# Patient Record
Sex: FEMALE | Race: WHITE | NOT HISPANIC OR LATINO | URBAN - METROPOLITAN AREA
[De-identification: names, ages, dates, MRNs, and addresses within clinical notes are randomized per-mention and may not be internally consistent; named-entity substitution may affect disease eponyms.]

---

## 2023-06-27 ENCOUNTER — OFFICE VISIT (OUTPATIENT)
Dept: URGENT CARE | Facility: CLINIC | Age: 43
End: 2023-06-27
Payer: COMMERCIAL

## 2023-06-27 VITALS
OXYGEN SATURATION: 96 % | HEART RATE: 112 BPM | BODY MASS INDEX: 25.39 KG/M2 | RESPIRATION RATE: 18 BRPM | TEMPERATURE: 99 F | DIASTOLIC BLOOD PRESSURE: 74 MMHG | WEIGHT: 158 LBS | SYSTOLIC BLOOD PRESSURE: 124 MMHG | HEIGHT: 66 IN

## 2023-06-27 DIAGNOSIS — U07.1 COVID: Primary | ICD-10-CM

## 2023-06-27 DIAGNOSIS — R05.9 COUGH, UNSPECIFIED TYPE: ICD-10-CM

## 2023-06-27 PROCEDURE — 99213 PR OFFICE/OUTPT VISIT, EST, LEVL III, 20-29 MIN: ICD-10-PCS | Mod: S$GLB,,, | Performed by: PHYSICIAN ASSISTANT

## 2023-06-27 PROCEDURE — 99213 OFFICE O/P EST LOW 20 MIN: CPT | Mod: S$GLB,,, | Performed by: PHYSICIAN ASSISTANT

## 2023-06-27 RX ORDER — FLUTICASONE PROPIONATE 50 MCG
SPRAY, SUSPENSION (ML) NASAL
COMMUNITY

## 2023-06-27 RX ORDER — PROMETHAZINE HYDROCHLORIDE AND DEXTROMETHORPHAN HYDROBROMIDE 6.25; 15 MG/5ML; MG/5ML
5 SYRUP ORAL EVERY 4 HOURS PRN
Qty: 240 ML | Refills: 0 | Status: SHIPPED | OUTPATIENT
Start: 2023-06-27 | End: 2023-07-07

## 2023-06-27 RX ORDER — FAMOTIDINE 10 MG/1
10 TABLET ORAL 2 TIMES DAILY
COMMUNITY

## 2023-06-27 RX ORDER — NIRMATRELVIR AND RITONAVIR 300-100 MG
KIT ORAL
Qty: 30 TABLET | Refills: 0 | Status: SHIPPED | OUTPATIENT
Start: 2023-06-27 | End: 2023-07-02

## 2023-06-27 NOTE — PROGRESS NOTES
"Subjective:      Patient ID: Nohemi Blue is a 42 y.o. female.    Vitals:  height is 5' 6" (1.676 m) and weight is 71.7 kg (158 lb). Her temporal temperature is 98.9 °F (37.2 °C). Her blood pressure is 124/74 and her pulse is 112 (abnormal). Her respiration is 18 and oxygen saturation is 96%.     Chief Complaint: Sore Throat    Patient tested positive with a home test last night.Symptoms began 6/25/23. They include sore throat, cough, congestion, slight cough, ear pain, body aches and fatigue.    Sore Throat   This is a new problem. The current episode started in the past 7 days. The problem has been gradually worsening. Neither side of throat is experiencing more pain than the other. The maximum temperature recorded prior to her arrival was 101 - 101.9 F. The fever has been present for Less than 1 day. The pain is at a severity of 5/10. The pain is moderate. Associated symptoms include congestion, coughing, ear pain and headaches. Pertinent negatives include no abdominal pain, diarrhea, drooling, neck pain, shortness of breath, stridor, trouble swallowing or vomiting. Treatments tried: Pepcid, Zertec and Benadril.     Constitution: Negative for chills, sweating, fatigue and fever.   HENT:  Positive for ear pain, congestion and sore throat. Negative for drooling, trouble swallowing and voice change.    Neck: Negative for neck pain, neck stiffness, painful lymph nodes and neck swelling.   Cardiovascular:  Negative for chest pain, leg swelling, palpitations, sob on exertion and passing out.   Eyes:  Negative for eye pain, eye redness, photophobia, double vision, blurred vision and eyelid swelling.   Respiratory:  Positive for cough. Negative for chest tightness, sputum production, bloody sputum, shortness of breath, stridor and wheezing.    Gastrointestinal:  Negative for abdominal pain, abdominal bloating, nausea, vomiting, constipation, diarrhea and heartburn.   Musculoskeletal:  Negative for joint pain, joint " swelling, abnormal ROM of joint, back pain, muscle cramps and muscle ache.   Skin:  Negative for rash and hives.   Allergic/Immunologic: Negative for seasonal allergies, food allergies, hives, itching and sneezing.   Neurological:  Positive for headaches. Negative for dizziness, light-headedness, passing out, loss of balance, altered mental status, loss of consciousness and seizures.   Hematologic/Lymphatic: Negative for swollen lymph nodes.   Psychiatric/Behavioral:  Negative for altered mental status and nervous/anxious. The patient is not nervous/anxious.     Objective:     Physical Exam   Constitutional: She is oriented to person, place, and time. She appears well-developed. She is cooperative.  Non-toxic appearance. She does not appear ill. No distress.   HENT:   Head: Normocephalic and atraumatic.   Ears:   Right Ear: Hearing, tympanic membrane, external ear and ear canal normal.   Left Ear: Hearing, tympanic membrane, external ear and ear canal normal.   Nose: Mucosal edema and rhinorrhea present. No nasal deformity. No epistaxis. Right sinus exhibits no maxillary sinus tenderness and no frontal sinus tenderness. Left sinus exhibits no maxillary sinus tenderness and no frontal sinus tenderness.   Mouth/Throat: Uvula is midline and mucous membranes are normal. No trismus in the jaw. Normal dentition. No uvula swelling. Posterior oropharyngeal erythema and cobblestoning present. No oropharyngeal exudate, posterior oropharyngeal edema or tonsillar abscesses. No tonsillar exudate.   Eyes: Conjunctivae and lids are normal. No scleral icterus.   Neck: Trachea normal and phonation normal. Neck supple. No edema present. No erythema present. No neck rigidity present.   Cardiovascular: Normal rate, regular rhythm, normal heart sounds and normal pulses.   Pulmonary/Chest: Effort normal and breath sounds normal. No accessory muscle usage or stridor. No respiratory distress. She has no decreased breath sounds. She has no  wheezes. She has no rhonchi. She has no rales.   Abdominal: Normal appearance.   Musculoskeletal: Normal range of motion.         General: No deformity. Normal range of motion.   Lymphadenopathy:     She has no cervical adenopathy.   Neurological: She is alert and oriented to person, place, and time. She exhibits normal muscle tone. Coordination normal.   Skin: Skin is warm, dry, intact, not diaphoretic, not pale and no rash. Capillary refill takes less than 2 seconds.   Psychiatric: Her speech is normal and behavior is normal. Judgment and thought content normal.   Nursing note and vitals reviewed.    Assessment:     1. COVID    2. Cough, unspecified type        Plan:       COVID  -     nirmatrelvir-ritonavir (PAXLOVID) 300 mg (150 mg x 2)-100 mg copackaged tablets (EUA); Take 3 tablets by mouth 2 (two) times daily. Each dose contains 2 nirmatrelvir (pink tablets) and 1 ritonavir (white tablet). Take all 3 tablets together  Dispense: 30 tablet; Refill: 0    Cough, unspecified type  -     promethazine-dextromethorphan (PROMETHAZINE-DM) 6.25-15 mg/5 mL Syrp; Take 5 mLs by mouth every 4 (four) hours as needed (cough).  Dispense: 240 mL; Refill: 0             Patient Instructions   If you were prescribed a narcotic or controlled medication, do not drive or operate heavy equipment or machinery while taking these medications.  You must understand that you've received an Urgent Care treatment only and that you may be released before all your medical problems are known or treated. You, the patient, will arrange for follow up care as instructed.  Follow up with your PCP or specialty clinic as directed if not improved or as needed. You can call [phone] to schedule an appointment with the appropriate provider.  If your condition worsens we recommend that you receive another evaluation at the Emergency Department for any concerns or worsening of condition.  Patient aware and verbalized understanding.     Stay  home/quarantine until symptoms are resolved.  ER if worsening symptoms- call before entry.  IF NOT IMPROVING, FOLLOW UP WITH VIRTUAL ONLINE VISIT WITH A PROVIDER 24/7- FOR MORE INFORMATION OR TO DOWNLOAD THE [place], VISIT [email or web address]    FOR 24/7 NURSE ADVICE, CALL [phone]  FOR COVID 19 RELATED QUESTIONS, CALL THE [facility] covid hotline: [phone]    Virtual visit with provider - [facility] ANYWHERE CARE:  [email or web address]    [place] FOR UP TO DATE INFORMATION:  Text or dial 211, test keyword LACOVID -[id number] OR DIAL 211    HELPFUL EXTERNAL RESOURCES:  OFFICE OF PUBLIC HEALTH: [place]   Http://ldh.[place].gov/  4-471-968-6072    [facility] FOR DIS[facility]  Https://www.cdc.gov/    WORLD HEALTH ORGANIZATION (WHO)  [email or web address].[email or web address]

## 2023-06-27 NOTE — PATIENT INSTRUCTIONS
If you were prescribed a narcotic or controlled medication, do not drive or operate heavy equipment or machinery while taking these medications.  You must understand that you've received an Urgent Care treatment only and that you may be released before all your medical problems are known or treated. You, the patient, will arrange for follow up care as instructed.  Follow up with your PCP or specialty clinic as directed if not improved or as needed. You can call [phone] to schedule an appointment with the appropriate provider.  If your condition worsens we recommend that you receive another evaluation at the Emergency Department for any concerns or worsening of condition.  Patient aware and verbalized understanding.     Stay home/quarantine until symptoms are resolved.  ER if worsening symptoms- call before entry.  IF NOT IMPROVING, FOLLOW UP WITH VIRTUAL ONLINE VISIT WITH A PROVIDER 24/7- FOR MORE INFORMATION OR TO DOWNLOAD THE [facility], VISIT [email or web address]    FOR 24/7 NURSE ADVICE, CALL [phone]  FOR COVID 19 RELATED QUESTIONS, CALL THE [facility] covid hotline: [phone]    Virtual visit with provider - [facility] ANYWHERE CARE:  [email or web address]    [place] FOR UP TO DATE INFORMATION:  Text or dial 211, test keyword LACOVID -562 OR DIAL 211    HELPFUL EXTERNAL RESOURCES:  OFFICE OF PUBLIC HEALTH: [place]   Http://ldh.la.gov/  4-396-353-2257    CENTER FOR DISEASE CONTROL  Https://www.cdc.gov/    WORLD HEALTH ORGANIZATION (WHO)  [email or web address].who.[email or web address]

## 2024-01-14 ENCOUNTER — OFFICE VISIT (OUTPATIENT)
Dept: URGENT CARE | Facility: CLINIC | Age: 44
End: 2024-01-14
Payer: COMMERCIAL

## 2024-01-14 VITALS
DIASTOLIC BLOOD PRESSURE: 74 MMHG | HEART RATE: 101 BPM | SYSTOLIC BLOOD PRESSURE: 106 MMHG | OXYGEN SATURATION: 98 % | TEMPERATURE: 99 F

## 2024-01-14 DIAGNOSIS — Z76.89 ENCOUNTER TO ESTABLISH CARE: ICD-10-CM

## 2024-01-14 DIAGNOSIS — N89.8 VAGINAL DISCHARGE: ICD-10-CM

## 2024-01-14 DIAGNOSIS — R05.9 COUGH, UNSPECIFIED TYPE: Primary | ICD-10-CM

## 2024-01-14 LAB
BILIRUB UR QL STRIP: NEGATIVE
CTP QC/QA: YES
CTP QC/QA: YES
GLUCOSE UR QL STRIP: NEGATIVE
KETONES UR QL STRIP: NEGATIVE
LEUKOCYTE ESTERASE UR QL STRIP: NEGATIVE
PH, POC UA: 5.5 (ref 5–8)
POC BLOOD, URINE: NEGATIVE
POC MOLECULAR INFLUENZA A AGN: NEGATIVE
POC MOLECULAR INFLUENZA B AGN: NEGATIVE
POC NITRATES, URINE: NEGATIVE
PROT UR QL STRIP: POSITIVE
SARS-COV-2 AG RESP QL IA.RAPID: NEGATIVE
SP GR UR STRIP: 1.02 (ref 1–1.03)
UROBILINOGEN UR STRIP-ACNC: NORMAL (ref 0.1–1.1)

## 2024-01-14 PROCEDURE — 87502 INFLUENZA DNA AMP PROBE: CPT | Mod: QW,S$GLB,, | Performed by: NURSE PRACTITIONER

## 2024-01-14 PROCEDURE — 87811 SARS-COV-2 COVID19 W/OPTIC: CPT | Mod: QW,S$GLB,, | Performed by: NURSE PRACTITIONER

## 2024-01-14 PROCEDURE — 81003 URINALYSIS AUTO W/O SCOPE: CPT | Mod: QW,S$GLB,, | Performed by: NURSE PRACTITIONER

## 2024-01-14 PROCEDURE — 99213 OFFICE O/P EST LOW 20 MIN: CPT | Mod: S$GLB,,, | Performed by: NURSE PRACTITIONER

## 2024-01-14 RX ORDER — ALBUTEROL SULFATE 90 UG/1
2 AEROSOL, METERED RESPIRATORY (INHALATION) EVERY 6 HOURS PRN
Qty: 18 G | Refills: 0 | Status: SHIPPED | OUTPATIENT
Start: 2024-01-14 | End: 2025-01-13

## 2024-01-14 RX ORDER — PHENAZOPYRIDINE HYDROCHLORIDE 95 MG/1
95 TABLET ORAL 3 TIMES DAILY PRN
COMMUNITY

## 2024-01-14 RX ORDER — BENZONATATE 100 MG/1
100 CAPSULE ORAL 3 TIMES DAILY PRN
Qty: 30 CAPSULE | Refills: 0 | Status: SHIPPED | OUTPATIENT
Start: 2024-01-14 | End: 2024-01-24

## 2024-01-14 RX ORDER — OXYMETAZOLINE HCL 0.05 %
2 SPRAY, NON-AEROSOL (ML) NASAL 2 TIMES DAILY
COMMUNITY

## 2024-01-14 RX ORDER — PROMETHAZINE HYDROCHLORIDE AND DEXTROMETHORPHAN HYDROBROMIDE 6.25; 15 MG/5ML; MG/5ML
5 SYRUP ORAL NIGHTLY PRN
Qty: 120 ML | Refills: 0 | Status: SHIPPED | OUTPATIENT
Start: 2024-01-14

## 2024-01-14 RX ORDER — FLUCONAZOLE 150 MG/1
150 TABLET ORAL DAILY
Qty: 1 TABLET | Refills: 0 | Status: SHIPPED | OUTPATIENT
Start: 2024-01-14 | End: 2024-01-15

## 2024-01-14 RX ORDER — ALBUTEROL SULFATE 1.25 MG/3ML
1.25 SOLUTION RESPIRATORY (INHALATION) EVERY 6 HOURS PRN
COMMUNITY
End: 2024-01-14

## 2024-01-14 RX ORDER — DOXYLAMINE SUCCINATE 25 MG
TABLET ORAL 2 TIMES DAILY
COMMUNITY

## 2024-01-14 NOTE — PROGRESS NOTES
Subjective:      Patient ID: Nohemi Blue is a 43 y.o. female.    Vitals:  oral temperature is 98.9 °F (37.2 °C). Her blood pressure is 106/74 and her pulse is 101. Her oxygen saturation is 98%.     Chief Complaint: Sinus Problem and Vaginitis    Pt states she has symptoms for 6 days, pt symptoms are not getting better. Pt did home covid test on Tuesday it was negative.  Patient was requesting a refill of her inhaler.  Patient denies any chest pain shortness breast.  Denies any fever or chills.  Patient also reports she has been having intermittent vaginal issues on and off for the last 6 months.  Patient reports that she was concerned about a potential yeast infection.  Denies any concern for STDs at this time.  Patient also is requesting to have an appointment with OBGYN.  Patient was not had Pap smear in over 2 years.  Patient denies any urinary symptoms.  Patient was complaining of vaginal irritation as well as itching.    Sinus Problem  This is a new problem. The current episode started in the past 7 days. The problem has been gradually worsening since onset. There has been no fever. Her pain is at a severity of 2/10. The pain is mild. Associated symptoms include congestion, coughing, headaches, sinus pressure and a sore throat. Treatments tried: afrin, maximum strength mucinex dm, inhaler. The treatment provided no relief.       HENT:  Positive for congestion, sinus pain, sinus pressure and sore throat.    Respiratory:  Positive for cough.    Neurological:  Positive for headaches.      Objective:     Physical Exam   Constitutional: She is oriented to person, place, and time. She appears well-developed. She is cooperative.  Non-toxic appearance. She does not appear ill. No distress.   HENT:   Head: Normocephalic and atraumatic.   Ears:   Right Ear: Hearing, tympanic membrane, external ear and ear canal normal.   Left Ear: Hearing, tympanic membrane, external ear and ear canal normal.   Nose: Nose normal. No  mucosal edema, rhinorrhea or nasal deformity. No epistaxis. Right sinus exhibits no maxillary sinus tenderness and no frontal sinus tenderness. Left sinus exhibits no maxillary sinus tenderness and no frontal sinus tenderness.   Mouth/Throat: Uvula is midline, oropharynx is clear and moist and mucous membranes are normal. No trismus in the jaw. Normal dentition. No uvula swelling. No oropharyngeal exudate, posterior oropharyngeal edema or posterior oropharyngeal erythema.   Eyes: Conjunctivae and lids are normal. No scleral icterus.   Neck: Trachea normal and phonation normal. Neck supple. No edema present. No erythema present. No neck rigidity present.   Cardiovascular: Normal rate, regular rhythm, normal heart sounds and normal pulses.   Pulmonary/Chest: Effort normal and breath sounds normal. No respiratory distress. She has no decreased breath sounds. She has no rhonchi.   Abdominal: Normal appearance.   Musculoskeletal: Normal range of motion.         General: No deformity. Normal range of motion.   Neurological: She is alert and oriented to person, place, and time. She exhibits normal muscle tone. Coordination normal.   Skin: Skin is warm, dry, intact, not diaphoretic and not pale.   Psychiatric: Her speech is normal and behavior is normal. Judgment and thought content normal.   Nursing note and vitals reviewed.      Assessment:     1. Cough, unspecified type    2. Vaginal discharge    3. Encounter to establish care        Plan:     Results for orders placed or performed in visit on 01/14/24   POCT Urinalysis, Dipstick, Automated, W/O Scope   Result Value Ref Range    POC Blood, Urine Negative Negative, Positive Slide, Positive Tube    POC Bilirubin, Urine Negative Negative, Positive Slide, Positive Tube    POC Urobilinogen, Urine normal 0.1 - 1.1    POC Ketones, Urine Negative Negative, Positive Slide, Positive Tube    POC Protein, Urine Positive (A) Negative, Positive Slide, Positive Tube    POC Nitrates,  Urine Negative Negative, Positive Slide, Positive Tube    POC Glucose, Urine Negative Negative, Positive Slide, Positive Tube    pH, UA 5.5 5 - 8    POC Specific Gravity, Urine 1.025 1.003 - 1.029    POC Leukocytes, Urine Negative Negative, Positive Slide, Positive Tube   SARS Coronavirus 2 Antigen, POCT Manual Read   Result Value Ref Range    SARS Coronavirus 2 Antigen Negative Negative     Acceptable Yes    POCT Influenza A/B MOLECULAR   Result Value Ref Range    POC Molecular Influenza A Ag Negative Negative, Not Reported    POC Molecular Influenza B Ag Negative Negative, Not Reported     Acceptable Yes      Patient was treated for viral upper respiratory infection as well as possible yeast infection.  Patient was placed on Diflucan based off his symptoms.  New swab is sent to the lab to test for vaginosis screening as well as STDs.  OBGYN referral is placed for patient.  All questions and concerns were answered.      Cough, unspecified type  -     SARS Coronavirus 2 Antigen, POCT Manual Read  -     POCT Influenza A/B MOLECULAR  -     albuterol (PROVENTIL HFA) 90 mcg/actuation inhaler; Inhale 2 puffs into the lungs every 6 (six) hours as needed for Wheezing. Rescue  Dispense: 18 g; Refill: 0  -     benzonatate (TESSALON) 100 MG capsule; Take 1 capsule (100 mg total) by mouth 3 (three) times daily as needed for Cough.  Dispense: 30 capsule; Refill: 0  -     promethazine-dextromethorphan (PROMETHAZINE-DM) 6.25-15 mg/5 mL Syrp; Take 5 mLs by mouth nightly as needed (as needed for cough).  Dispense: 120 mL; Refill: 0    Vaginal discharge  -     POCT Urinalysis, Dipstick, Automated, W/O Scope  -     NuSwab Vaginitis Plus (VG+)  -     Ambulatory referral/consult to Obstetrics / Gynecology  -     fluconazole (DIFLUCAN) 150 MG Tab; Take 1 tablet (150 mg total) by mouth once daily. for 1 day  Dispense: 1 tablet; Refill: 0    Encounter to establish care  -     Ambulatory referral/consult to  Obstetrics / Gynecology      Patient Instructions   A cold is caused by a virus that can settle in your nose, throat or lungs. This causes  a runny or stuffy nose and sneezing. You may also have a sore throat, cough, headache, fever and muscle aches. Different cold viruses last different lengths  of time, but the average time is 2 to 14 days.    Seek immediate medical care if you develop fever, chest pain, or shortness of breath.     Treatment  There is no cure for the common cold.     Antibiotics may be used to treat signs of a secondary infection, but they do not treat  the cold virus. Try these tips to  keep yourself comfortable:  -Get plenty of rest.  -Drink plenty of fluids, at least 8 large glasses of fluid a day. Good fluidchoices are water, fruit juices high in Vitamin C, tea, gelatin, or broths and soups. These help to keep mucus thin and ease congestion.  -Use salt water gargle, cough drops or throat sprays to relieve throat pain. Mi ¼ to ½ teaspoon of salt in 1 cup of warm water for a salt water gargle  solution.  -Use petroleum jelly or lip balm around lips and nose to prevent chapping.  -Use saline nose drops or spray to help ease congestion.    Over the Counter (OTC) Medicines:  Take over the counter medicines as needed to ease your signs.  Read labels carefully.  Use a product that treats only the signs that you have. Ask your pharmacist  for recommendations. Be sure to ask about possible interactions with other  medicines you are taking.  Common medicines used to treat signs of a cold include:    #Antihistamines that dry secretions in your nose and lungs. Some of these  may cause you to feel drowsy. Talk to your pharmacist before use if you  have glaucoma or an enlarged prostate.  Names of some medicines in this group include:  - Diphenhydramine  - Brompheniramine  - Chlorpheniramine  - Clemastine    # Decongestants that tighten blood vessels in your nose to decrease  stuffiness and pressure. Use  nasal spray decongestants for up to three days  only. Longer use can make congestion worse. Talk to your pharmacist  before use if you have high blood pressure, heart disease, diabetes or an  enlarged prostate.    Names of some medicines in this group include:  - Pseudoephedrine (Sudafed)- kept behind the counter and requires identification  to purchase in limited quantities because it can be used to make illegal  drugs  - Phenylephrine  - Oxymetazoline nasal spray (Afrin)  - Cough suppressant, also called antitussive, such as dextromethorphan.  This medicine decreases your reflex and sensitivity to cough. This  medicine may be kept behind the pharmacy counter for purchase.  - Expectorant, sometimes called mucolytic, such as guaifenesin (mucinex). This  medicine thins mucus secretions in the lungs to make it easier for you to  cough up and out. (Be sure to drink plenty of fluids when taking this medication)  Cold and cough medicines often contain more than one type of medicine.  Ask the pharmacist for help to confirm that you are not using more than one  product with the same or similar ingredient. For example, some cold and  cough medicines have acetaminophen or ibuprofen in them to help lower a  fever or ease muscle aches. Do not take extra acetaminophen (Tylenol) or  ibuprofen (Advil, Motrin) if the cold or cough medicine has it as an  ingredient. Too much medicine could be harmful.    Take the correct dose as listed on the package. Do not take more than  recommended.    Use a Humidifier:  A cool mist humidifier can make breathing easier by thinning mucus. Do not use  a steam humidifier as hot water can cause burns if spilled.  Place the humidifier a few feet from the bed. Drain and clean each day with  soap and water to prevent bacteria and mold from growing.  Indoor humidity should not be above 50%. Stop using the humidifier if you  notice moisture on windows, walls or pictures.  You do not need to add any  medicine to the humidifier.  If you cannot get a humidifier, place a pan of water next to heating vents and  refill the water level daily. The water will evaporate and add moisture to the  Room.    How to prevent the spread of colds  -Wash your hands with soap and water or use alcohol based hand   often. Dry hands wet from washing with soap on a paper towel instead of cloth towel.  -Cough or sneeze into your elbow to avoid spreading germs.  -Wipe down common surfaces, such as door knobs and faucet handles, with a disinfectant spray.  -Do not share cups or utensils.

## 2024-01-14 NOTE — PROGRESS NOTES
Subjective:      Patient ID: Nohemi Blue is a 43 y.o. female.    Vitals:  oral temperature is 98.9 °F (37.2 °C). Her blood pressure is 106/74 and her pulse is 101. Her oxygen saturation is 98%.     Chief Complaint: Sinus Problem and Vaginitis    Pt states she has had vaginal discharge for 5 days, pt states she has had irritation and some burning. Pt has a hx of yeast infection, pt is sexually active uses condoms inconsistently, has sherley new sex partner for the past 6 months, and has not had a [employer] in 2 years. Pt wants to be evalauated and treated for symptoms    Sinus Problem  This is a recurrent problem. The current episode started in the past 7 days. The problem has been gradually worsening since onset. (Vaginal burning, ) Treatments tried: azo. The treatment provided no relief.     Genitourinary:  Positive for vaginal discharge.   Allergic/Immunologic: Positive for itching.    Objective:     Physical Exam    Assessment:     No diagnosis found.    Plan:       There are no diagnoses linked to this encounter.

## 2024-01-17 LAB
A VAGINAE DNA VAG QL NAA+PROBE: NORMAL SCORE
BVAB2 DNA VAG QL NAA+PROBE: NORMAL SCORE
C ALBICANS DNA VAG QL NAA+PROBE: NEGATIVE
C GLABRATA DNA VAG QL NAA+PROBE: NEGATIVE
C TRACH DNA VAG QL NAA+PROBE: NEGATIVE
MEGA1 DNA VAG QL NAA+PROBE: NORMAL SCORE
N GONORRHOEA DNA VAG QL NAA+PROBE: NEGATIVE
T VAGINALIS DNA VAG QL NAA+PROBE: NEGATIVE

## 2024-01-18 ENCOUNTER — TELEPHONE (OUTPATIENT)
Dept: URGENT CARE | Facility: CLINIC | Age: 44
End: 2024-01-18
Payer: COMMERCIAL

## 2024-01-19 ENCOUNTER — TELEPHONE (OUTPATIENT)
Dept: URGENT CARE | Facility: CLINIC | Age: 44
End: 2024-01-19
Payer: COMMERCIAL

## 2024-01-20 NOTE — TELEPHONE ENCOUNTER
Spoke to patient, confirmed name/. Informed of neg NuSwab VG+ results. Did not take Diflucan because symptoms had improved by the time of the visit. She says vaginal issues have been recurrent/intermittent x 6 months and is interested in further evaluation. She was advised that this is the extent of the testing that we can offer in urgent care. She was advised to contact her GYN Dr. Moreau to see if they can work her in. She verbalized understanding.

## 2024-03-05 ENCOUNTER — OFFICE VISIT (OUTPATIENT)
Dept: OBSTETRICS AND GYNECOLOGY | Facility: CLINIC | Age: 44
End: 2024-03-05
Payer: COMMERCIAL

## 2024-03-05 VITALS
WEIGHT: 164.25 LBS | HEIGHT: 66 IN | BODY MASS INDEX: 26.4 KG/M2 | DIASTOLIC BLOOD PRESSURE: 74 MMHG | SYSTOLIC BLOOD PRESSURE: 100 MMHG

## 2024-03-05 DIAGNOSIS — N89.8 VAGINAL ODOR: ICD-10-CM

## 2024-03-05 DIAGNOSIS — Z31.69 INFERTILITY COUNSELING: ICD-10-CM

## 2024-03-05 DIAGNOSIS — Z80.41 FAMILY HISTORY OF OVARIAN CANCER: ICD-10-CM

## 2024-03-05 DIAGNOSIS — A63.0 HPV (HUMAN PAPILLOMA VIRUS) ANOGENITAL INFECTION: Primary | ICD-10-CM

## 2024-03-05 PROCEDURE — 99999 PR PBB SHADOW E&M-EST. PATIENT-LVL III: CPT | Mod: PBBFAC,,, | Performed by: OBSTETRICS & GYNECOLOGY

## 2024-03-05 PROCEDURE — 1159F MED LIST DOCD IN RCRD: CPT | Mod: CPTII,S$GLB,, | Performed by: OBSTETRICS & GYNECOLOGY

## 2024-03-05 PROCEDURE — 3078F DIAST BP <80 MM HG: CPT | Mod: CPTII,S$GLB,, | Performed by: OBSTETRICS & GYNECOLOGY

## 2024-03-05 PROCEDURE — 3008F BODY MASS INDEX DOCD: CPT | Mod: CPTII,S$GLB,, | Performed by: OBSTETRICS & GYNECOLOGY

## 2024-03-05 PROCEDURE — 3074F SYST BP LT 130 MM HG: CPT | Mod: CPTII,S$GLB,, | Performed by: OBSTETRICS & GYNECOLOGY

## 2024-03-05 PROCEDURE — 99203 OFFICE O/P NEW LOW 30 MIN: CPT | Mod: S$GLB,,, | Performed by: OBSTETRICS & GYNECOLOGY

## 2024-03-05 RX ORDER — MELOXICAM 15 MG/1
TABLET ORAL
COMMUNITY
Start: 2024-02-26

## 2024-12-17 ENCOUNTER — OFFICE VISIT (OUTPATIENT)
Dept: OBSTETRICS AND GYNECOLOGY | Facility: CLINIC | Age: 44
End: 2024-12-17
Payer: COMMERCIAL

## 2024-12-17 VITALS — WEIGHT: 157.44 LBS | BODY MASS INDEX: 25.41 KG/M2 | SYSTOLIC BLOOD PRESSURE: 98 MMHG | DIASTOLIC BLOOD PRESSURE: 62 MMHG

## 2024-12-17 DIAGNOSIS — A63.0 HPV (HUMAN PAPILLOMA VIRUS) ANOGENITAL INFECTION: Primary | ICD-10-CM

## 2024-12-17 DIAGNOSIS — Z11.3 SCREENING EXAMINATION FOR STD (SEXUALLY TRANSMITTED DISEASE): ICD-10-CM

## 2024-12-17 DIAGNOSIS — Z01.812 PRE-PROCEDURE LAB EXAM: ICD-10-CM

## 2024-12-17 LAB
B-HCG UR QL: NEGATIVE
CTP QC/QA: YES

## 2024-12-17 PROCEDURE — 3078F DIAST BP <80 MM HG: CPT | Mod: CPTII,S$GLB,, | Performed by: OBSTETRICS & GYNECOLOGY

## 2024-12-17 PROCEDURE — 3074F SYST BP LT 130 MM HG: CPT | Mod: CPTII,S$GLB,, | Performed by: OBSTETRICS & GYNECOLOGY

## 2024-12-17 PROCEDURE — 81025 URINE PREGNANCY TEST: CPT | Mod: S$GLB,,, | Performed by: OBSTETRICS & GYNECOLOGY

## 2024-12-17 PROCEDURE — 87591 N.GONORRHOEAE DNA AMP PROB: CPT | Performed by: OBSTETRICS & GYNECOLOGY

## 2024-12-17 PROCEDURE — 57455 BIOPSY OF CERVIX W/SCOPE: CPT | Mod: S$GLB,,, | Performed by: OBSTETRICS & GYNECOLOGY

## 2024-12-17 PROCEDURE — 88305 TISSUE EXAM BY PATHOLOGIST: CPT | Performed by: STUDENT IN AN ORGANIZED HEALTH CARE EDUCATION/TRAINING PROGRAM

## 2024-12-17 PROCEDURE — 99999 PR PBB SHADOW E&M-EST. PATIENT-LVL III: CPT | Mod: PBBFAC,,, | Performed by: OBSTETRICS & GYNECOLOGY

## 2024-12-17 PROCEDURE — 0352U VAGINOSIS SCREEN BY DNA PROBE: CPT | Performed by: OBSTETRICS & GYNECOLOGY

## 2024-12-17 PROCEDURE — 88175 CYTOPATH C/V AUTO FLUID REDO: CPT | Performed by: OBSTETRICS & GYNECOLOGY

## 2024-12-17 PROCEDURE — 3008F BODY MASS INDEX DOCD: CPT | Mod: CPTII,S$GLB,, | Performed by: OBSTETRICS & GYNECOLOGY

## 2024-12-17 PROCEDURE — 99214 OFFICE O/P EST MOD 30 MIN: CPT | Mod: 25,S$GLB,, | Performed by: OBSTETRICS & GYNECOLOGY

## 2024-12-17 PROCEDURE — 88342 IMHCHEM/IMCYTCHM 1ST ANTB: CPT | Performed by: STUDENT IN AN ORGANIZED HEALTH CARE EDUCATION/TRAINING PROGRAM

## 2024-12-17 PROCEDURE — 1159F MED LIST DOCD IN RCRD: CPT | Mod: CPTII,S$GLB,, | Performed by: OBSTETRICS & GYNECOLOGY

## 2024-12-17 NOTE — PROGRESS NOTES
Chief Complaint   Patient presents with    Colposcopy       History of Present Illness: Nohemi Blue is a 44 y.o. female that presents today 12/17/2024 with LMP Patient's last menstrual period was 12/03/2024 (exact date). for   Chief Complaint   Patient presents with    Colposcopy     She reports  cheating and desires STD screening    She report mouth ulceration for 2 months not healing but she keeping opening it.     She plans to do labs with her PCP for STD check    Past Medical History:   Diagnosis Date    Asthma     HPV (human papilloma virus) anogenital infection 2012       Past Surgical History:   Procedure Laterality Date    colonoscopy  2021    repeat in 5       Outpatient Medications Prior to Visit   Medication Sig Dispense Refill    albuterol (PROVENTIL HFA) 90 mcg/actuation inhaler Inhale 2 puffs into the lungs every 6 (six) hours as needed for Wheezing. Rescue 18 g 0    famotidine (PEPCID) 10 MG tablet Take 10 mg by mouth 2 (two) times daily.      fluticasone propionate (FLONASE) 50 mcg/actuation nasal spray 2 sprays in each nostril Nasally Once a day for 30 days      oxymetazoline (AFRIN) 0.05 % nasal spray 2 sprays by Nasal route 2 (two) times daily.      meloxicam (MOBIC) 15 MG tablet  (Patient not taking: Reported on 12/17/2024)      miconazole (MICOTIN) 2 % cream Apply topically 2 (two) times daily. (Patient not taking: Reported on 12/17/2024)      phenazopyridine (PYRIDIUM) 95 MG tablet Take 95 mg by mouth 3 (three) times daily as needed for Pain. (Patient not taking: Reported on 12/17/2024)      promethazine-dextromethorphan (PROMETHAZINE-DM) 6.25-15 mg/5 mL Syrp Take 5 mLs by mouth nightly as needed (as needed for cough). (Patient not taking: Reported on 12/17/2024) 120 mL 0     No facility-administered medications prior to visit.       Review of patient's allergies indicates:   Allergen Reactions    Amoxicillin      Patient states she is allergic to ALL ANTIBIOTICS.    Penicillin Other  (See Comments)    Penicillins     Zithromax z-brenna [azithromycin]     Doxycycline Rash       Family History   Problem Relation Name Age of Onset    Diabetes Mother      Ovarian cancer Mother  68    Cancer Father      Hypertension Father      Diabetes Father      Early death Paternal Grandmother      Birth defects Paternal Grandfather      Birth defects Paternal Aunt         Social History     Tobacco Use    Smoking status: Never    Smokeless tobacco: Never   Substance Use Topics    Alcohol use: Not Currently    Drug use: Never       OB History    Para Term  AB Living   0 0 0 0 0 0   SAB IAB Ectopic Multiple Live Births   0 0 0 0 0         BP 98/62   Wt 71.4 kg (157 lb 6.5 oz)   LMP 2024 (Exact Date)   Physical Exam:  APPEARANCE: Well nourished, well developed, in no acute distress.  SKIN: Normal skin turgor, no lesions.  NECK: Neck symmetric without masses   RESPIRATORY: Normal respiratory effort with no retractions or use of accessory muscles  CARDIOVASCULAR: Peripheral vascular system with no swelling no varicosities and palpation of pulses normal  LYMPHATIC: No enlargements of the lymph nodes noted in the neck, axillae, or groin  ABDOMEN: Soft. No tenderness or masses. No hepatosplenomegaly. No hernias.  PELVIC: Normal external female genitalia without lesions. Normal hair distribution. Adequate perineal body, normal urethral meatus. Urethra with no masses.  Bladder nontender. Vagina moist and well rugated without lesions or discharge. Cervix pink and without lesions. No significant cystocele or rectocele. Bimanual exam showed uterus normal size, shape, position, mobile and nontender. Adnexa without masses or tenderness. Urethra and bladder normal.  EXTREMITIES: No clubbing cyanosis or edema.    ASSESSMENT/PLAN:  HPV (human papilloma virus) anogenital infection  -     Specimen to Pathology, Ob/Gyn    Pre-procedure lab exam  -     POCT Urine Pregnancy    Screening examination for STD (sexually  transmitted disease)  -     Vaginosis Screen by DNA Probe; Future; Expected date: 12/17/2024  -     C. trachomatis/N. gonorrhoeae by AMP DNA        30 minutes spent today spent preparing reviewing previous external notes, reviewing previous results, performing medical examination, ordering tests or medications, counseling and documenting.         TIMEOUT PERFORMED  Patient identified, procedure confirmed, and allergies reviewed.     COLPOSCOPY:    UPT: negative    Female patient presents for colposcopy.    PRE-COLPOSCOPY PROCEDURE COUNSELING:  Discussed the abnormal pap test findings, HPV, need for colposcopy and possible biopsies to determine a diagnosis and plan of care, treatments available, the minimal risks of bleeding and infection with a colposcopy, alternatives to colposcopy and she agrees to proceed.    COLPOSCOPY EXAM:   TIME OUT PERFORMED. The cervix was visualized with a speculum. Acetic acid was applied.  The entire tranformation zone could be adequately visualized.      White flat epithelium was present at 4,Location.  Mosaic pattern was not present.    Punctation was not present.    Abnormal vessels were not present.    Biopsy performed at 4 Location.    ECC - Not done.    Specimens placed in formalin and sent to pathology. Monsel's solution was applied at biopsy sites to stop bleeding.    The speculum was removed.  The patient tolerated the procedure well.    IMPRESSION:   Abnormal Pap 795.09    Colposcopy Impression:  Satisfactory:  AVERY 1    POST COLPOSCOPY COUNSELING:   Manage post colposcopy cramping with NSAIDs, Tylenol or Rx per [facility].  Avoid anything in vagina (intercourse, douching, tampons) one week after the procedure.  Expect a clumpy blackish vaginal discharge (Monsel's solution) for several days.  Report bleeding heavier than a period, worsening pain, fever > 101.0 F, or foul-smelling vaginal discharge.  HPV vaccine recommended according to FDA age guidelines.  Importance of follow-up  stressed.    Counseling lasted approximately 15 minutes and all her questions were answered.    FOLLOW-UP:   Based on biopsy results.

## 2024-12-18 LAB
BACTERIAL VAGINOSIS DNA: NOT DETECTED
C TRACH DNA SPEC QL NAA+PROBE: NOT DETECTED
CANDIDA GLABRATA/KRUSEI: NOT DETECTED
CANDIDA RRNA VAG QL PROBE: NOT DETECTED
N GONORRHOEA DNA SPEC QL NAA+PROBE: NOT DETECTED
TRICHOMONAS VAGINALIS: NOT DETECTED

## 2024-12-20 ENCOUNTER — OFFICE VISIT (OUTPATIENT)
Dept: FAMILY MEDICINE | Facility: CLINIC | Age: 44
End: 2024-12-20
Payer: COMMERCIAL

## 2024-12-20 ENCOUNTER — HOSPITAL ENCOUNTER (OUTPATIENT)
Dept: RADIOLOGY | Facility: HOSPITAL | Age: 44
Discharge: HOME | End: 2024-12-20
Payer: COMMERCIAL

## 2024-12-20 VITALS
SYSTOLIC BLOOD PRESSURE: 100 MMHG | DIASTOLIC BLOOD PRESSURE: 62 MMHG | BODY MASS INDEX: 25.66 KG/M2 | WEIGHT: 158.94 LBS | OXYGEN SATURATION: 97 % | HEART RATE: 79 BPM

## 2024-12-20 DIAGNOSIS — R10.13 ABDOMINAL DISCOMFORT, EPIGASTRIC: ICD-10-CM

## 2024-12-20 DIAGNOSIS — Z11.3 SCREENING EXAMINATION FOR STD (SEXUALLY TRANSMITTED DISEASE): ICD-10-CM

## 2024-12-20 DIAGNOSIS — T14.8XXA MUSCLE STRAIN: ICD-10-CM

## 2024-12-20 DIAGNOSIS — B00.2 ORAL HERPES SIMPLEX INFECTION: ICD-10-CM

## 2024-12-20 DIAGNOSIS — M54.31 BILATERAL SCIATICA: ICD-10-CM

## 2024-12-20 DIAGNOSIS — S99.921A RIGHT FOOT INJURY, INITIAL ENCOUNTER: ICD-10-CM

## 2024-12-20 DIAGNOSIS — Z00.00 ENCOUNTER FOR GENERAL ADULT MEDICAL EXAMINATION W/O ABNORMAL FINDINGS: Primary | ICD-10-CM

## 2024-12-20 DIAGNOSIS — R11.2 NAUSEA AND VOMITING, UNSPECIFIED VOMITING TYPE: ICD-10-CM

## 2024-12-20 DIAGNOSIS — G89.29 CHRONIC RIGHT SHOULDER PAIN: ICD-10-CM

## 2024-12-20 DIAGNOSIS — M54.32 BILATERAL SCIATICA: ICD-10-CM

## 2024-12-20 DIAGNOSIS — M41.9 SCOLIOSIS OF THORACIC SPINE, UNSPECIFIED SCOLIOSIS TYPE: ICD-10-CM

## 2024-12-20 DIAGNOSIS — Z13.31 POSITIVE DEPRESSION SCREENING: ICD-10-CM

## 2024-12-20 DIAGNOSIS — M25.511 CHRONIC RIGHT SHOULDER PAIN: ICD-10-CM

## 2024-12-20 DIAGNOSIS — M25.552 LEFT HIP PAIN: ICD-10-CM

## 2024-12-20 DIAGNOSIS — L91.8 SKIN TAG: ICD-10-CM

## 2024-12-20 LAB
CLINICAL INFO: NORMAL
CYTO CVX: NORMAL
CYTOLOGIST CVX/VAG CYTO: NORMAL
CYTOLOGIST CVX/VAG CYTO: NORMAL
CYTOLOGY CMNT CVX/VAG CYTO-IMP: NORMAL
CYTOLOGY PAP THIN PREP EXPLANATION: NORMAL
DATE OF PREVIOUS PAP: NORMAL
DATE PREVIOUS BX: NO
FINAL PATHOLOGIC DIAGNOSIS: NORMAL
GEN CATEG CVX/VAG CYTO-IMP: NORMAL
GROSS: NORMAL
LMP START DATE: NORMAL
Lab: NORMAL
MICROORGANISM CVX/VAG CYTO: NORMAL
PATHOLOGIST CVX/VAG CYTO: NORMAL
SERVICE CMNT-IMP: NORMAL
SPECIMEN SOURCE CVX/VAG CYTO: NORMAL
STAT OF ADQ CVX/VAG CYTO-IMP: NORMAL

## 2024-12-20 PROCEDURE — 3008F BODY MASS INDEX DOCD: CPT | Mod: CPTII,S$GLB,,

## 2024-12-20 PROCEDURE — 73030 X-RAY EXAM OF SHOULDER: CPT | Mod: TC,FY,PO,RT

## 2024-12-20 PROCEDURE — 3078F DIAST BP <80 MM HG: CPT | Mod: CPTII,S$GLB,,

## 2024-12-20 PROCEDURE — 1159F MED LIST DOCD IN RCRD: CPT | Mod: CPTII,S$GLB,,

## 2024-12-20 PROCEDURE — 73630 X-RAY EXAM OF FOOT: CPT | Mod: 26,RT,, | Performed by: RADIOLOGY

## 2024-12-20 PROCEDURE — 3074F SYST BP LT 130 MM HG: CPT | Mod: CPTII,S$GLB,,

## 2024-12-20 PROCEDURE — 3044F HG A1C LEVEL LT 7.0%: CPT | Mod: CPTII,S$GLB,,

## 2024-12-20 PROCEDURE — 73030 X-RAY EXAM OF SHOULDER: CPT | Mod: 26,RT,, | Performed by: RADIOLOGY

## 2024-12-20 PROCEDURE — 99999 PR PBB SHADOW E&M-EST. PATIENT-LVL IV: CPT | Mod: PBBFAC,,,

## 2024-12-20 PROCEDURE — 99204 OFFICE O/P NEW MOD 45 MIN: CPT | Mod: S$GLB,,,

## 2024-12-20 PROCEDURE — 73630 X-RAY EXAM OF FOOT: CPT | Mod: TC,FY,PO,RT

## 2024-12-20 RX ORDER — VALACYCLOVIR HYDROCHLORIDE 1 G/1
TABLET, FILM COATED ORAL
Qty: 8 TABLET | Refills: 1 | Status: SHIPPED | OUTPATIENT
Start: 2024-12-20

## 2024-12-20 RX ORDER — FAMOTIDINE 10 MG/1
10 TABLET ORAL 2 TIMES DAILY
Qty: 60 TABLET | Refills: 3 | Status: SHIPPED | OUTPATIENT
Start: 2024-12-20

## 2024-12-20 NOTE — PROGRESS NOTES
Patient ID: Nohemi Blue is a 44 y.o. female.    Chief Complaint: Annual Exam (Pt would like a full std check up), Establish Care, and Toe Pain    History of Present Illness    CHIEF COMPLAINT:  Nohemi presents today for follow-up after a breakup and foot injury.    MENTAL HEALTH:  She reports experiencing sadness and crying since a breakup on October 24th. She is currently seeing a therapist and feels she is managing well. She denies suicidal ideation. Her previously reported left-sided temple headaches have resolved since the breakup.    MUSCULOSKELETAL:  She injured her foot at work by tripping over a high chair leg, resulting in bruising and swelling. She reports left hip pain between the hip and groin area when rising from seated position, associated with severe scoliosis and significant spinal curvature. She also notes right shoulder rotator cuff pain for over two weeks, particularly with lifting.    GASTROINTESTINAL:  She experiences episodic severe upper abdominal pain and pressure that radiates from the heart area to the right shoulder blade. Recent episode occurred a few days ago, accompanied by weakness and occasional vomiting. Symptoms typically resolve within one hour after taking Pepcid and Gas-X. She denies burping during episodes.    ENT AND ALLERGIES:  She uses Afrin for nasal congestion, occasionally extending use beyond recommended three-day limit but not exceeding five days. She has Flonase and Astelin available. She has seasonal and animal allergies. She reports allergies to amoxicillin, penicillin, zithromax, and doxycycline with rash to most antibiotics.    ORAL HEALTH:  She reports a persistent intraoral bump present for several months, noting typical similar lesions usually resolve within 1-2 days. She has history of cold sores with one previous painful lip eruption.    NEUROLOGICAL:  She experiences numbness and tingling in the lower half of her head, primarily noticed while at  work.    GYNECOLOGICAL:  She requests full STD screening due to concerns about potential exposure. Recent screening was negative for gonorrhea, chlamydia, bacterial vaginosis, and candida. She reports history of recurrent candida infections during previous relationship. She has longstanding HPV positive status with recent pathology showing LSIL with CIN1 mild dysplasia, negative for high-grade changes.    FAMILY HISTORY:  Her mother currently has stage four ovarian cancer undergoing chemotherapy. Her paternal grandfather survived colon cancer, while her aunt  from colon cancer. Family history is also significant for diabetes and hypertension.    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      ROS:  ENT: reports nasal congestion  Gastrointestinal: reports vomiting  Musculoskeletal: reports muscle pain, denies joint swelling  Skin: denies itching  Neurological: denies headache, reports numbness, reports tingling  Psychiatric: denies suicidal ideation         There is no problem list on file for this patient.      Current Outpatient Medications on File Prior to Visit   Medication Sig Dispense Refill    albuterol (PROVENTIL HFA) 90 mcg/actuation inhaler Inhale 2 puffs into the lungs every 6 (six) hours as needed for Wheezing. Rescue 18 g 0    fluticasone propionate (FLONASE) 50 mcg/actuation nasal spray 2 sprays in each nostril Nasally Once a day for 30 days       No current facility-administered medications on file prior to visit.       Past Medical History:   Diagnosis Date    Asthma     HPV (human papilloma virus) anogenital infection        Past Surgical History:   Procedure Laterality Date    colonoscopy      repeat in 5    dental surgery          Family History   Problem Relation Name Age of Onset    Diabetes Mother      Ovarian cancer Mother  68        stage 4 ovarian, undergoing chemo    Cancer Father      Hypertension Father      Diabetes  Father      Early death Paternal Grandmother      Birth defects Paternal Grandfather      Colon cancer Paternal Grandfather      Birth defects Paternal Aunt      Colon cancer Paternal Aunt         Social History     Socioeconomic History    Marital status: Single   Tobacco Use    Smoking status: Never    Smokeless tobacco: Never   Substance and Sexual Activity    Alcohol use: Not Currently    Drug use: Never    Sexual activity: Yes       Review of patient's allergies indicates:   Allergen Reactions    Amoxicillin      Patient states she is allergic to ALL ANTIBIOTICS.    Penicillin Other (See Comments)    Penicillins     Zithromax z-brenna [azithromycin]     Doxycycline Rash        Health Maintenance Due   Topic Date Due    Mammogram  Never done    Influenza Vaccine (1) 09/01/2024    COVID-19 Vaccine (3 - 2024-25 season) 09/01/2024       Objective     Vitals:    12/20/24 1453   BP: 100/62   Pulse: 79      Body mass index is 25.66 kg/m².     Physical Exam  Vitals and nursing note reviewed.   Constitutional:       General: She is not in acute distress.     Appearance: Normal appearance. She is not ill-appearing or toxic-appearing.   HENT:      Head: Normocephalic and atraumatic.      Nose: Nose normal.      Mouth/Throat:      Mouth: Mucous membranes are moist.   Eyes:      Extraocular Movements: Extraocular movements intact.   Cardiovascular:      Rate and Rhythm: Normal rate and regular rhythm.      Heart sounds: Normal heart sounds. No murmur heard.     No friction rub. No gallop.   Pulmonary:      Effort: Pulmonary effort is normal.      Breath sounds: Normal breath sounds. No wheezing, rhonchi or rales.   Abdominal:      General: Bowel sounds are normal.      Tenderness: There is no abdominal tenderness. There is no guarding.   Musculoskeletal:      Cervical back: Neck supple.      Right foot: Normal range of motion.        Feet:    Feet:      Comments: Bruising   Skin:     General: Skin is warm.   Neurological:       Mental Status: She is alert and oriented to person, place, and time.   Psychiatric:         Mood and Affect: Mood normal.         Behavior: Behavior normal.         Assessment & Plan    Assessed depression symptoms related to recent breakup; determined no intervention needed as patient has existing therapist  Evaluated right foot injury from work incident; ordered X-ray to rule out fracture  Considered gallbladder issues as cause of recurrent abdominal pain episodes; referred to gastroenterology  Assessed hip pain, likely related to scoliosis; recommended physical therapy  Evaluated right shoulder pain, suspect supraspinatus involvement; recommended conservative management with physical therapy  Considered oral herpes as cause of persistent mouth sore; prescribed antiviral medication trial  Noted skin tags on neck; referred to dermatology for evaluation and potential removal  Assessed intermittent left temple pain and lower head numbness/tingling; suspect sciatica, recommended addressing through physical therapy    NASAL CONGESTION:  - Explained rebound nasal congestion risk with prolonged Afrin use; recommended alternative nasal sprays.    GALLBLADDER ISSUES:  - Discussed gallbladder symptoms and potential need for future removal if episodes increase in frequency.    ROTATOR CUFF TEAR (RIGHT SHOULDER):  - Educated on rotator cuff anatomy and supraspinatus involvement in shoulder pain.    HERPESVIRAL DERMATITIS (COLD SORES):  - Explained cold sore etiology and treatment approach with antiviral medication.  - Started Valtrex 2 g twice daily for 1 day; initiate at onset of cold sore symptoms.    GENERAL HEALTH RECOMMENDATIONS:  - Nohemi to increase water intake to 64 ounces daily.  - Continued ibuprofen 800 mg as needed for pain; take with food.    FOOT CONTUSION:  - Ordered X-ray of right foot.    LABS:  - Ordered comprehensive lab work including vitamin D level, STD panel (RPR), and lipid panel.    ABDOMINAL  PAIN:  - Referred to gastroenterology for evaluation of recurrent abdominal pain.    MUSCULOSKELETAL PAIN:  - Referred to physical therapy for hip pain, back pain, and right shoulder pain.    SKIN DISORDERS:  - Referred to dermatology for evaluation of oral lesion and skin tags.    FOLLOW-UP:  - Follow up in 1 month to assess progress.         Encounter for general adult medical examination w/o abnormal findings  -     Comprehensive Metabolic Panel; Future; Expected date: 12/20/2024  -     TSH; Future; Expected date: 12/20/2024  -     Hemoglobin A1C; Future; Expected date: 12/20/2024  -     Lipid Panel; Future; Expected date: 12/20/2024  -     CBC Auto Differential; Future; Expected date: 12/20/2024  -     Misc Sendout Test, Blood 25-hydroxy vitamin D; Future; Expected date: 12/20/2024  -     Hepatitis C antibody; Future; Expected date: 12/20/2024  -     HIV 1/2 Ag/Ab (4th Gen); Future; Expected date: 12/20/2024  -     Treponema Pallidium Antibodies IgG, IgM; Future; Expected date: 12/20/2024    Positive depression screening  Comments:  I have reviewed the positive depression score with the patient and found that no additional intervention is needed at this time.    Screening examination for STD (sexually transmitted disease)  -     Hepatitis C antibody; Future; Expected date: 12/20/2024  -     HIV 1/2 Ag/Ab (4th Gen); Future; Expected date: 12/20/2024  -     Treponema Pallidium Antibodies IgG, IgM; Future; Expected date: 12/20/2024    Right foot injury, initial encounter  -     X-Ray Foot Complete Right; Future; Expected date: 12/20/2024    Abdominal discomfort, epigastric  -     Ambulatory referral/consult to Gastroenterology; Future; Expected date: 12/27/2024  -     famotidine (PEPCID) 10 MG tablet; Take 1 tablet (10 mg total) by mouth 2 (two) times daily.  Dispense: 60 tablet; Refill: 3    Nausea and vomiting, unspecified vomiting type  -     Ambulatory referral/consult to Gastroenterology; Future; Expected  date: 12/27/2024  -     famotidine (PEPCID) 10 MG tablet; Take 1 tablet (10 mg total) by mouth 2 (two) times daily.  Dispense: 60 tablet; Refill: 3    Muscle strain  -     Ambulatory Referral/Consult to Physical Therapy; Future; Expected date: 12/27/2024    Scoliosis of thoracic spine, unspecified scoliosis type  -     Ambulatory Referral/Consult to Physical Therapy; Future; Expected date: 12/27/2024  -     Ambulatory Referral/Consult to Physical Therapy; Future; Expected date: 12/27/2024    Left hip pain  -     Ambulatory Referral/Consult to Physical Therapy; Future; Expected date: 12/27/2024  -     Ambulatory Referral/Consult to Physical Therapy; Future; Expected date: 12/27/2024    Chronic right shoulder pain  -     Ambulatory Referral/Consult to Physical Therapy; Future; Expected date: 12/27/2024  -     X-ray Shoulder 2 or More Views Right; Future; Expected date: 12/20/2024    Oral herpes simplex infection  -     valACYclovir (VALTREX) 1000 MG tablet; Take 2g twice daily for 1 day. Initiate as soon as symptom arises.  Dispense: 8 tablet; Refill: 1  -     Ambulatory referral/consult to Dermatology; Future; Expected date: 12/27/2024    Skin tag  -     Ambulatory referral/consult to Dermatology; Future; Expected date: 12/27/2024    Bilateral sciatica        Follow up in about 4 weeks (around 1/17/2025), or if symptoms worsen or fail to improve.    This note was generated with the assistance of ambient listening technology. Verbal consent was obtained by the patient and accompanying visitor(s) for the recording of patient appointment to facilitate this note. I attest to having reviewed and edited the generated note for accuracy, though some syntax or spelling errors may persist. Please contact the author of this note for any clarification.        Isabelle Montano MD  12/23/2024

## 2024-12-26 DIAGNOSIS — Z12.31 OTHER SCREENING MAMMOGRAM: ICD-10-CM

## 2024-12-30 DIAGNOSIS — E55.9 VITAMIN D INSUFFICIENCY: Primary | ICD-10-CM

## 2024-12-30 RX ORDER — ERGOCALCIFEROL 1.25 MG/1
50000 CAPSULE ORAL
Qty: 8 CAPSULE | Refills: 0 | Status: SHIPPED | OUTPATIENT
Start: 2024-12-30

## 2025-01-15 ENCOUNTER — CLINICAL SUPPORT (OUTPATIENT)
Dept: REHABILITATION | Facility: HOSPITAL | Age: 45
End: 2025-01-15
Payer: COMMERCIAL

## 2025-01-15 DIAGNOSIS — M25.552 LEFT HIP PAIN: ICD-10-CM

## 2025-01-15 DIAGNOSIS — G89.29 CHRONIC RIGHT SHOULDER PAIN: ICD-10-CM

## 2025-01-15 DIAGNOSIS — M54.6 PAIN IN THORACIC SPINE: ICD-10-CM

## 2025-01-15 DIAGNOSIS — M25.552 PAIN IN LEFT HIP: Primary | ICD-10-CM

## 2025-01-15 DIAGNOSIS — M41.9 SCOLIOSIS OF THORACIC SPINE, UNSPECIFIED SCOLIOSIS TYPE: ICD-10-CM

## 2025-01-15 DIAGNOSIS — M25.511 CHRONIC RIGHT SHOULDER PAIN: ICD-10-CM

## 2025-01-15 PROCEDURE — 97112 NEUROMUSCULAR REEDUCATION: CPT | Mod: PO | Performed by: PHYSICAL THERAPIST

## 2025-01-15 PROCEDURE — 97162 PT EVAL MOD COMPLEX 30 MIN: CPT | Mod: PO | Performed by: PHYSICAL THERAPIST
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
## 2025-01-24 ENCOUNTER — OFFICE VISIT (OUTPATIENT)
Dept: FAMILY MEDICINE | Facility: CLINIC | Age: 45
End: 2025-01-24
Payer: COMMERCIAL

## 2025-01-24 ENCOUNTER — TELEPHONE (OUTPATIENT)
Dept: FAMILY MEDICINE | Facility: CLINIC | Age: 45
End: 2025-01-24
Payer: COMMERCIAL

## 2025-01-24 VITALS
SYSTOLIC BLOOD PRESSURE: 98 MMHG | DIASTOLIC BLOOD PRESSURE: 62 MMHG | WEIGHT: 158.06 LBS | HEART RATE: 79 BPM | BODY MASS INDEX: 25.51 KG/M2 | OXYGEN SATURATION: 98 %

## 2025-01-24 DIAGNOSIS — B37.31 CANDIDA VAGINITIS: ICD-10-CM

## 2025-01-24 DIAGNOSIS — K13.70 ORAL LESION: ICD-10-CM

## 2025-01-24 DIAGNOSIS — N89.8 VAGINAL DISCHARGE: ICD-10-CM

## 2025-01-24 DIAGNOSIS — N89.8 VAGINAL ODOR: Primary | ICD-10-CM

## 2025-01-24 DIAGNOSIS — B96.89 BACTERIAL VAGINOSIS: ICD-10-CM

## 2025-01-24 DIAGNOSIS — M25.373 INSTABILITY OF ANKLE, UNSPECIFIED LATERALITY: ICD-10-CM

## 2025-01-24 DIAGNOSIS — N76.0 BACTERIAL VAGINOSIS: ICD-10-CM

## 2025-01-24 DIAGNOSIS — G89.29 CHRONIC RIGHT SHOULDER PAIN: ICD-10-CM

## 2025-01-24 DIAGNOSIS — M25.371 RIGHT ANKLE INSTABILITY: Primary | ICD-10-CM

## 2025-01-24 DIAGNOSIS — N89.8 VAGINAL ODOR: ICD-10-CM

## 2025-01-24 DIAGNOSIS — M25.511 CHRONIC RIGHT SHOULDER PAIN: ICD-10-CM

## 2025-01-24 PROCEDURE — 3074F SYST BP LT 130 MM HG: CPT | Mod: CPTII,S$GLB,,

## 2025-01-24 PROCEDURE — 99999 PR PBB SHADOW E&M-EST. PATIENT-LVL II: CPT | Mod: PBBFAC,,,

## 2025-01-24 PROCEDURE — 3008F BODY MASS INDEX DOCD: CPT | Mod: CPTII,S$GLB,,

## 2025-01-24 PROCEDURE — 99214 OFFICE O/P EST MOD 30 MIN: CPT | Mod: S$GLB,,,

## 2025-01-24 PROCEDURE — 3078F DIAST BP <80 MM HG: CPT | Mod: CPTII,S$GLB,,

## 2025-01-24 RX ORDER — METRONIDAZOLE 7.5 MG/G
1 GEL VAGINAL DAILY
Qty: 70 G | Refills: 1 | Status: SHIPPED | OUTPATIENT
Start: 2025-01-24 | End: 2025-01-29

## 2025-01-24 RX ORDER — FLUCONAZOLE 150 MG/1
150 TABLET ORAL DAILY
Qty: 1 TABLET | Refills: 5 | Status: SHIPPED | OUTPATIENT
Start: 2025-01-24 | End: 2025-01-25

## 2025-01-24 NOTE — TELEPHONE ENCOUNTER
----- Message from Cat sent at 1/24/2025  2:31 PM CST -----  Contact: self  Type:  Needs Medical Advice  Who Called:  Pt   Best Call Back Number:  [phone]  Additional Information:  ETA 2:45  Pt states she's running late due to weather issues   Please call to advise... Thank you...

## 2025-01-27 PROBLEM — N89.8 VAGINAL DISCHARGE: Status: ACTIVE | Noted: 2025-01-27

## 2025-01-27 PROBLEM — N89.8 VAGINAL ODOR: Status: ACTIVE | Noted: 2025-01-27

## 2025-01-27 NOTE — PROGRESS NOTES
Patient ID: Nohemi Blue is a 44 y.o. female.    Chief Complaint: Follow-up (1m)    History of Present Illness    CHIEF COMPLAINT:  Nohemi presents today for follow up of multiple concerns including vaginal symptoms after sexual activity.    GYNECOLOGIC:  She experiences vaginal symptoms including malodorous discharge and itching, typically developing 2-3 days post-intercourse and persisting for 3-4 days. Previous STD screening, bacterial vaginosis, and yeast infection testing were negative.    MUSCULOSKELETAL:  She reports ongoing significant right shoulder discomfort. She has only completed initial physical therapy assessment without starting regular sessions. X-rays showed evidence of scoliosis but were otherwise normal. Her foot injury has improved with resolved bruising, though some redness remains. She notes discomfort with dorsiflexion but denies significant pain with walking. Both ankles occasionally give out when descending stairs, requiring her to catch herself to prevent falling. She has a history of ankle injury in third grade requiring crutches for several days after landing on an inverted foot.    ORAL LESIONS:  She has a persistent intraoral lip lesion unresponsive to Valacyclovir. She acknowledges frequent lip biting which may be exacerbating the condition.    ALLERGIES:  She reports multiple antibiotic allergies including penicillins, doxycycline, and azithromycin, though metronidazole may be tolerated.      ROS:  Musculoskeletal: reports joint pain, denies muscle pain  Skin: reports itching  Female Genitourinary: reports vaginal discharge         Patient Active Problem List   Diagnosis    Pain in left hip    Chronic right shoulder pain    Pain in thoracic spine    Vaginal discharge    Vaginal odor       Current Outpatient Medications on File Prior to Visit   Medication Sig Dispense Refill    ergocalciferol (ERGOCALCIFEROL) 50,000 unit Cap Take 1 capsule (50,000 Units total) by mouth every 7 days.  8 capsule 0    famotidine (PEPCID) 10 MG tablet Take 1 tablet (10 mg total) by mouth 2 (two) times daily. 60 tablet 3    fluticasone propionate (FLONASE) 50 mcg/actuation nasal spray 2 sprays in each nostril Nasally Once a day for 30 days      valACYclovir (VALTREX) 1000 MG tablet Take 2g twice daily for 1 day. Initiate as soon as symptom arises. 8 tablet 1    albuterol (PROVENTIL HFA) 90 mcg/actuation inhaler Inhale 2 puffs into the lungs every 6 (six) hours as needed for Wheezing. Rescue 18 g 0     No current facility-administered medications on file prior to visit.       Past Medical History:   Diagnosis Date    Asthma     HPV (human papilloma virus) anogenital infection 2012       Past Surgical History:   Procedure Laterality Date    colonoscopy  2021    repeat in 5    dental surgery          Family History   Problem Relation Name Age of Onset    Diabetes Mother      Ovarian cancer Mother  68        stage 4 ovarian, undergoing chemo    Cancer Father      Hypertension Father      Diabetes Father      Early death Paternal Grandmother      Birth defects Paternal Grandfather      Colon cancer Paternal Grandfather      Birth defects Paternal Aunt      Colon cancer Paternal Aunt         Social History     Socioeconomic History    Marital status: Single   Tobacco Use    Smoking status: Never    Smokeless tobacco: Never   Substance and Sexual Activity    Alcohol use: Not Currently    Drug use: Never    Sexual activity: Yes       Review of patient's allergies indicates:   Allergen Reactions    Amoxicillin      Patient states she is allergic to ALL ANTIBIOTICS.    Penicillin Other (See Comments)    Penicillins     Zithromax z-brenna [azithromycin]     Doxycycline Rash        Health Maintenance Due   Topic Date Due    Mammogram  Never done    Influenza Vaccine (1) 09/01/2024    COVID-19 Vaccine (3 - 2024-25 season) 09/01/2024       Objective     Vitals:    01/24/25 1448   BP: 98/62   Pulse: 79      Body mass index is 25.51  kg/m².     Physical Exam  Constitutional:       Appearance: Normal appearance.   HENT:      Head: Normocephalic and atraumatic.      Nose: Nose normal.      Mouth/Throat:      Mouth: Mucous membranes are moist.   Eyes:      Extraocular Movements: Extraocular movements intact.   Cardiovascular:      Rate and Rhythm: Normal rate.   Musculoskeletal:         General: No swelling or deformity.      Cervical back: Neck supple.      Right lower leg: No edema.      Left lower leg: No edema.   Neurological:      Mental Status: She is alert and oriented to person, place, and time.   Psychiatric:         Mood and Affect: Mood normal.         Behavior: Behavior normal.         Assessment & Plan    Assessed foot injury, noting improvement  Evaluated recurring vaginal symptoms post-intercourse, suspecting bacterial vaginosis (BV) or yeast infection  Considered patient's history of antibiotic allergies when determining treatment options for suspected BV/yeast  Assessed ongoing rotator cuff issue, recommending MRI after initial x-ray showed no abnormalities  Evaluated persistent lip lesion after failed Valtrex treatment, considering dermatology referral for biopsy  Suspected ankle laxity as cause of recurrent ankle weakness    BACTERIAL VAGINOSIS:  - Evaluated the patient's recurrent vaginal issues after sexual intercourse, including malodorous discharge and pruritus.  - Suspected bacterial vaginosis based on symptoms, despite previous negative test results.  - Explained the difference between bacterial vaginosis (associated with odor) and candidiasis (associated with pruritus).  - Discussed the importance of probiotics in maintaining vaginal health.  - Recommend a specific vaginal health probiotic with lactobacillus (brand with 47,000 reviews on [employer]).  - Prescribed metronidazole 0.75% vaginal gel: Apply daily at bedtime for 5 days if symptomatic, or single dose after intercourse for prevention.    CANDIDIASIS:  - Prescribed  fluconazole (Diflucan): Take 1 tablet as needed for candidiasis symptoms, provided 5 refills.    VAGINAL HEALTH:  - Instructed the patient to contact the office if vaginal symptoms persist or worsen after treatment.  - Advised the patient to message for a vaginal symptom testing kit if needed in the future.    ROTATOR CUFF DISORDER:  - Evaluated the patient's ongoing issues with rotator cuff.  - Noted that x-ray showed no abnormal findings, but scoliosis was observed.  - Ordered MRI of right shoulder WO Contrast to further evaluate the condition.  - Referred the patient to orthopedics for evaluation of suspected rotator cuff disorder.  - Advised the patient to continue physical therapy for rotator cuff issue.  - Instructed the patient to follow up with orthopedics for potential injection or further treatment.    SCOLIOSIS:  - Noted that x-ray revealed the presence of scoliosis.    ANKLE LAXITY:  - Evaluated the patient's reports of bilateral ankle weakness and instability, particularly when descending stairs.  - Suspected ankle laxity or joint laxity.  - Educated the patient on potential benefits of ankle support for joint laxity.  - Recommend ankle support or stabilizer for ankle weakness.  - Demonstrated application of an ankle wrap.  - Suggested purchase of ankle stabilizer from [employer].    PERSISTENT LIP LESION:  - Evaluated the patient's persistent lesion inside lip that has not responded to Valtrex treatment.  - Noted that the lesion is not painful but annoying, possibly exacerbated by patient biting on it.  - Considered alternative causes due to failed Valtrex treatment.  - Referred the patient to dermatology for evaluation of persistent lip lesion, potential biopsy, or removal.    MEDICATION ALLERGIES:  - Noted the patient's reported allergy to penicillin.  - Noted the patient's reported allergies to doxycycline and azithromycin.  - Considered metronidazole as a potential antibiotic option, noting it's likely  safe despite patient's other antibiotic allergies.         Right ankle instability  Comments:  recommend higher top shoes, ankle supports, discussed in appt    Bacterial vaginosis    Vaginal discharge  Comments:  postcoital    Vaginal odor  Comments:  postcoital    Instability of ankle, unspecified laterality        Follow up if symptoms worsen or fail to improve.    This note was generated with the assistance of ambient listening technology. Verbal consent was obtained by the patient and accompanying visitor(s) for the recording of patient appointment to facilitate this note. I attest to having reviewed and edited the generated note for accuracy, though some syntax or spelling errors may persist. Please contact the author of this note for any clarification.        Isabelle Montano MD  01/27/2025

## 2025-01-29 ENCOUNTER — CLINICAL SUPPORT (OUTPATIENT)
Dept: REHABILITATION | Facility: HOSPITAL | Age: 45
End: 2025-01-29
Payer: COMMERCIAL

## 2025-01-29 DIAGNOSIS — G89.29 CHRONIC RIGHT SHOULDER PAIN: ICD-10-CM

## 2025-01-29 DIAGNOSIS — E55.9 VITAMIN D INSUFFICIENCY: ICD-10-CM

## 2025-01-29 DIAGNOSIS — M25.552 PAIN IN LEFT HIP: Primary | ICD-10-CM

## 2025-01-29 DIAGNOSIS — M54.6 PAIN IN THORACIC SPINE: ICD-10-CM

## 2025-01-29 DIAGNOSIS — M25.511 CHRONIC RIGHT SHOULDER PAIN: ICD-10-CM

## 2025-01-29 PROCEDURE — 97112 NEUROMUSCULAR REEDUCATION: CPT | Mod: PO | Performed by: PHYSICAL THERAPIST

## 2025-01-29 NOTE — TELEPHONE ENCOUNTER
Refill Routing Note   Medication(s) are not appropriate for processing by [facility] for the following reason(s):        Non-participating provider    [facility] action(s):  Route             Appointments  past 12m or future 3m with PCP    Date Provider   Last Visit   1/24/2025 Isabelle Montano MD   Next Visit   Visit date not found Isabelle Montano MD   ED visits in past 90 days: 0        Note composed:11:13 AM 01/29/2025

## 2025-01-29 NOTE — PROGRESS NOTES
[facility]   Physical Therapy Treatment Note      Name: Nohemi Blue  Clinic Number: [id number]    Therapy Diagnosis:   Encounter Diagnoses   Name Primary?    Pain in left hip Yes    Chronic right shoulder pain     Pain in thoracic spine      Physician: Isabelle Montano MD    Visit Date: 1/29/2025    Physician Orders: PT Eval and Treat   Medical Diagnosis from Referral:      M41.9 (ICD-10-CM) - Scoliosis of thoracic spine, unspecified scoliosis type   M25.552 (ICD-10-CM) - Left hip pain   M25.511,G89.29 (ICD-10-CM) - Chronic right shoulder pain      Evaluation Date: 1/15/2025  Authorization Period Expiration: 12/20/2025   Plan of Care Expiration: 3/15/2025  Progress Note Due: 2/15/2025  Visit # / Visits authorized: 1/1; 1/12  FOTO: 1/3     Precautions: Standard      Time In: 1700  Time Out: 1755  Total Appointment Time (timed & untimed codes): 55 minutes    Subjective     Pt reports: Has not performed HEP. Shoulder blade pain better. Today low back and R shoulder are irritated.    She was not compliant with home exercise program.  Response to previous treatment: HEP  Functional change: ongoing    Pain: 4/10  Location: Shoulder + L hip; global    Objective      Objective Measures updated at progress report unless specified.     Treatment     Nohemi received the treatments listed below:      neuromuscular re-education activities to improve: Balance, Coordination, Kinesthetic, Sense, Proprioception, and Posture for 55 minutes. The following activities were included:     Patient education:  - diagnosis, prognosis, impairments  - impingement of shoulder and hip  - importance of stability training / postural training  - HEP 1/19/25    Serratus punch supine 2x20  Handcuff lift supine 2x20 YTB    Pelvic tilt training  Pelvic tilting x20  Fallout 2x20  90/90 LE DB 2x10     Quadruped hip extension 3x10x5 sec   Quadruped shoulder taps 3x10 ea rajani  Cat/Cow training   Cat/Cow ~3x10 ea  Birddog  3x10      NEXT VISIT:  - DN L rhomboid  - Birddog  - Serratus +  - Serratus clock  - Serratus rockback    Patient Education and Home Exercises       Education provided:   - see above    Written Home Exercises Provided: Patient instructed to cont prior HEP. Exercises were reviewed and Nohemi was able to demonstrate them prior to the end of the session.  Nohemi demonstrated good understanding of the education provided. See EMR under Patient Instructions for exercises provided during therapy sessions    Assessment     Anais all interventions fairly well. Difficulty with proprioception for motions along with lower abdominal and serratus NM activation, but improved some within visit. Anais birddog and cat/cow best for introduction to global stability training.     Cont to require focus on stability to address impingement symptoms.    At IE:    Patient presents with pain, + impingement testing of R shoulder and bilateral hips, joint hypermobility of C/S and shoulders, muscular TrP of L rhomboids, and core / scapular stability deficits. Impairments contributory to pain affecting ADLs.     Nohemi Is progressing well towards her goals.   Pt prognosis is Good.     Pt will continue to benefit from skilled outpatient physical therapy to address the deficits listed in the problem list box on initial evaluation, provide pt/family education and to maximize pt's level of independence in the home and community environment.     Pt's spiritual, cultural and educational needs considered and pt agreeable to plan of care and goals.     Anticipated barriers to physical therapy: multiple sites of pain / work demands     GOALS: Short Term Goals: 0-4 weeks  1.Report decreased Shoulder, Scapular and Hip pain  <   / =  3  /10 at worst to increase tolerance for ADLs  2. Increase strength in B shoulders/scapular stabilizers by 1/3 MMT grade to increase tolerance for ADL and work activities.  3. Pt to tolerate HEP to improve ROM and  independence with ADL's     Long Term Goals: 5-8 weeks  1.Report decreased Shoulder, Scapular and Hip pain  <   / =  1  /10 at worst to increase tolerance for ADLs  2. Negative impingement testing of shoulder / hip  3.Increased strength in B shoulders/scapular stabilizers to >/= 4/5 MMT grade to increase tolerance for ADL and work activities.  4. Pt will have improved gcode of CJ (20-40% limited) on FOTO neck score for neck pain disability in order to demonstrate true functional improvement.    Plan     Plan of care Certification: 1/15/2025 to 3/15/2025.     Outpatient Physical Therapy 1-2 times weekly for 8 weeks to include the following interventions: Manual Therapy, Moist Heat/ Ice, Neuromuscular Re-ed, Patient Education, Self Care, Therapeutic Activities, Therapeutic Exercise, and DN.     MARILYN GALEANA, PT, DPT, OCS

## 2025-01-30 RX ORDER — ERGOCALCIFEROL 1.25 MG/1
50000 CAPSULE ORAL
Qty: 8 CAPSULE | Refills: 0 | Status: SHIPPED | OUTPATIENT
Start: 2025-01-30

## 2025-02-08 ENCOUNTER — HOSPITAL ENCOUNTER (OUTPATIENT)
Dept: RADIOLOGY | Facility: HOSPITAL | Age: 45
Discharge: HOME | End: 2025-02-08
Payer: COMMERCIAL

## 2025-02-08 DIAGNOSIS — M25.511 CHRONIC RIGHT SHOULDER PAIN: ICD-10-CM

## 2025-02-08 DIAGNOSIS — G89.29 CHRONIC RIGHT SHOULDER PAIN: ICD-10-CM

## 2025-02-08 PROCEDURE — 73221 MRI JOINT UPR EXTREM W/O DYE: CPT | Mod: 26,RT,, | Performed by: RADIOLOGY

## 2025-02-08 PROCEDURE — 73221 MRI JOINT UPR EXTREM W/O DYE: CPT | Mod: TC,PO,RT

## 2025-04-07 ENCOUNTER — E-VISIT (OUTPATIENT)
Dept: URGENT CARE | Facility: CLINIC | Age: 45
End: 2025-04-07
Payer: COMMERCIAL

## 2025-04-07 DIAGNOSIS — J45.909 ASTHMA, UNSPECIFIED ASTHMA SEVERITY, UNSPECIFIED WHETHER COMPLICATED, UNSPECIFIED WHETHER PERSISTENT: Primary | ICD-10-CM

## 2025-04-07 RX ORDER — ALBUTEROL SULFATE 90 UG/1
2 INHALANT RESPIRATORY (INHALATION) EVERY 6 HOURS PRN
Qty: 18 G | Refills: 0 | Status: SHIPPED | OUTPATIENT
Start: 2025-04-07 | End: 2026-04-07

## 2025-04-07 NOTE — PROGRESS NOTES
Patient ID: Nohemi Blue is a 44 y.o. female.    Chief Complaint: General Illness (Entered automatically based on patient selection in [employer].)          274}  The patient initiated a request through [employer] on 4/7/2025 for evaluation and management with a chief complaint of General Illness (Entered automatically based on patient selection in [employer].)     I evaluated the questionnaire submission on 04/07/2025 .    Total Time (in minutes): 6     [facility] Evisit [facility]-Medication    4/7/2025  7:42 AM CDT - Filed by Patient   What do you need help with? Asthma   Do you agree to participate in an E-Visit? Yes   If you have any of the following symptoms, please present to your local emergency room or call 911:  I acknowledge   Medication requests for narcotics will not be addressed via an E-Visit.  Please schedule an appointment. I acknowledge   Do you have any of the following pregnancy-related conditions? None   Do you want to address a new or existing medication? I would like to address a medication I currently take   What is the main issue you would like addressed today? Having adthma woth a cold. Need a refill of asthma meds today if possible.   Would you like to change or continue your medication? Continue medication   What medication would you like to continue?  Albuterol   Are you taking it as prescribed? Yes    What medical condition is the  medication intended to treat? Asthma   Is the medication helping your condition? Yes   Are you having any side effects from the medication? No   Provide any additional information you feel is important. Would like to be able to  script from [address] after work this afternoon.   Please attach any relevant images or files    Are you able to take your vital signs? No          Active Problem List with Overview Notes    Diagnosis Date Noted    Vaginal discharge 01/27/2025     postcoital      Vaginal odor 01/27/2025     postcoital      Pain in left hip  01/15/2025    Chronic right shoulder pain 01/15/2025    Pain in thoracic spine 01/15/2025      Recent Labs Obtained:  Lab Results   Component Value Date    WBC 11.35 12/20/2024    HGB 13.8 12/20/2024    HCT 40.7 12/20/2024    MCV 90 12/20/2024     12/20/2024     12/20/2024    K 4.2 12/20/2024    GLU 98 12/20/2024    CREATININE 1.0 12/20/2024    EGFRNORACEVR >60.0 12/20/2024    HGBA1C 5.3 12/20/2024    TSH 1.805 12/20/2024      Review of patient's allergies indicates:   Allergen Reactions    Amoxicillin      Patient states she is allergic to ALL ANTIBIOTICS.    Penicillin Other (See Comments)    Penicillins     Zithromax z-brenna [azithromycin]     Doxycycline Rash       Encounter Diagnosis   Name Primary?    Asthma, unspecified asthma severity, unspecified whether complicated, unspecified whether persistent Yes        No orders of the defined types were placed in this encounter.     Medications Ordered This Encounter   Medications    albuterol (PROVENTIL HFA) 90 mcg/actuation inhaler     Sig: Inhale 2 puffs into the lungs every 6 (six) hours as needed for Wheezing. Rescue     Dispense:  18 g     Refill:  0        E-Visit Time Tracking:    Day 1 Time (in minutes): 6    Total Time (in minutes): 6      274}